# Patient Record
Sex: MALE | Race: ASIAN | NOT HISPANIC OR LATINO | URBAN - METROPOLITAN AREA
[De-identification: names, ages, dates, MRNs, and addresses within clinical notes are randomized per-mention and may not be internally consistent; named-entity substitution may affect disease eponyms.]

---

## 2022-02-10 ENCOUNTER — EMERGENCY (EMERGENCY)
Facility: HOSPITAL | Age: 32
LOS: 1 days | Discharge: HOME | End: 2022-02-10
Admitting: EMERGENCY MEDICINE
Payer: COMMERCIAL

## 2022-02-10 VITALS
DIASTOLIC BLOOD PRESSURE: 76 MMHG | TEMPERATURE: 97 F | RESPIRATION RATE: 16 BRPM | HEART RATE: 98 BPM | OXYGEN SATURATION: 99 % | SYSTOLIC BLOOD PRESSURE: 123 MMHG

## 2022-02-10 PROCEDURE — 99283 EMERGENCY DEPT VISIT LOW MDM: CPT

## 2022-02-10 NOTE — ED PROVIDER NOTE - PHYSICAL EXAMINATION
Vital signs reviewed.   CONSTITUTIONAL: Well-appearing; well-nourished; in no apparent distress. Non-toxic appearing.   HEAD: Normocephalic, atraumatic.  EYES: Normal conjunctiva and no sclera injection noted  ENT: normal nose; no rhinorrhea  NECK: No FAROM. No midline tenderness.  CARD: Normal S1, S2  RESP: Normal chest excursion with respiration; breath sounds clear and equal bilaterally  EXT/MS: LT lower extermity. FAROM of knee. No swelling. Minimal tenderness. Pedal pulse intact  SKIN: Normal for age and race; warm; dry; good turgor; no apparent lesions or exudate noted.  NEURO: Awake, alert, oriented x 3.  PSYCH: Normal mood; appropriate affect.
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
## 2022-02-10 NOTE — ED PROVIDER NOTE - NS ED ROS FT
Constitutional: (-) fever   Head: Normal cephalic, Atraumatic  Cardiovascular: (-) chest pain, (-) wheezing  Respiratory: (-) cough, (-) shortness of breath  Gastrointestinal: (-) vomiting, (-) diarrhea, (-) abdominal pain  : (-) dysuria   Musculoskeletal: (-) back pain (+) LT leg pain  Integumentary: (-) rash, (-) edema  Neurological: (-)loc  Allergic/Immunologic: (-) pruritus
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
## 2022-02-10 NOTE — ED PROVIDER NOTE - PATIENT PORTAL LINK FT
You can access the FollowMyHealth Patient Portal offered by [facility] by registering at the following website: [email or web address]. By joining [employer]’s FollowMyHealth portal, you will also be able to view your health information using other applications (apps) compatible with our system.

## 2022-11-17 ENCOUNTER — EMERGENCY (EMERGENCY)
Facility: HOSPITAL | Age: 32
LOS: 1 days | Discharge: HOME | End: 2022-11-17
Attending: STUDENT IN AN ORGANIZED HEALTH CARE EDUCATION/TRAINING PROGRAM | Admitting: STUDENT IN AN ORGANIZED HEALTH CARE EDUCATION/TRAINING PROGRAM

## 2022-11-17 VITALS
HEART RATE: 78 BPM | DIASTOLIC BLOOD PRESSURE: 69 MMHG | OXYGEN SATURATION: 99 % | RESPIRATION RATE: 16 BRPM | TEMPERATURE: 98 F | SYSTOLIC BLOOD PRESSURE: 117 MMHG

## 2022-11-17 PROCEDURE — 99283 EMERGENCY DEPT VISIT LOW MDM: CPT

## 2022-11-17 RX ORDER — OMEPRAZOLE 10 MG/1
1 CAPSULE, DELAYED RELEASE ORAL
Qty: 30 | Refills: 0
Start: 2022-11-17 | End: 2022-12-16

## 2022-11-17 NOTE — ED PROVIDER NOTE - NSFOLLOWUPINSTRUCTIONS_ED_ALL_ED_FT
Follow-up with your primary care doctor  Follow- up with Gastroenterology  Continue taking omeprazole daily before breakfast  When symptoms flare, take maalox or tums, available over the counter, as directed  Avoid excessive greasy and spicey foods as able.  Return to ED with any new / worsening symptoms or anything else concerning to you

## 2022-11-17 NOTE — ED ADULT TRIAGE NOTE - CHIEF COMPLAINT QUOTE
pt c/o RLQ pain beginning this morning with n/v. no diarrhea, fever, urinary symptoms. pt in no distress. no PMH

## 2022-11-17 NOTE — ED PROVIDER NOTE - CLINICAL SUMMARY MEDICAL DECISION MAKING FREE TEXT BOX
31M h/o gastritis p/w now resolved epigastric pain, which he had woken up with this AM, a/w one episode of NBNB vomiting. Currently symptom free. Abd exam benign and nontender. Likely 2/2 gastritis/GERD. Has omeprazole at home. WIll provide GI follow-up

## 2022-11-17 NOTE — ED PROVIDER NOTE - PATIENT PORTAL LINK FT
You can access the FollowMyHealth Patient Portal offered by [facility] by registering at the following website: [email or web address]. By joining [employer]’s FollowMyHealth portal, you will also be able to view your health information using other applications (apps) compatible with our system.

## 2022-11-17 NOTE — ED PROVIDER NOTE - OBJECTIVE STATEMENT
31M h/o GERD p/w abd pain, now resolved. States he awoke with epigastric pain, a/w NBNB vomiting x1. Took omeprazole and symptoms resolved within 3 hrs. Denies diarrhea, sick contacts or travels. No fever/chills. Noted similar pain ~3months ago, typically associated with spicey foods or if he doesn't eat for a prolonged period of time.

## 2024-01-29 ENCOUNTER — EMERGENCY (EMERGENCY)
Facility: HOSPITAL | Age: 34
LOS: 1 days | Discharge: HOME | End: 2024-01-29
Attending: STUDENT IN AN ORGANIZED HEALTH CARE EDUCATION/TRAINING PROGRAM | Admitting: STUDENT IN AN ORGANIZED HEALTH CARE EDUCATION/TRAINING PROGRAM
Payer: COMMERCIAL

## 2024-01-29 VITALS
HEART RATE: 75 BPM | SYSTOLIC BLOOD PRESSURE: 122 MMHG | RESPIRATION RATE: 18 BRPM | OXYGEN SATURATION: 100 % | DIASTOLIC BLOOD PRESSURE: 78 MMHG | TEMPERATURE: 98 F

## 2024-01-29 VITALS
SYSTOLIC BLOOD PRESSURE: 135 MMHG | TEMPERATURE: 98 F | HEART RATE: 74 BPM | DIASTOLIC BLOOD PRESSURE: 88 MMHG | OXYGEN SATURATION: 98 % | RESPIRATION RATE: 97 BRPM

## 2024-01-29 PROBLEM — K29.70 GASTRITIS, UNSPECIFIED, WITHOUT BLEEDING: Chronic | Status: ACTIVE | Noted: 2022-11-17

## 2024-01-29 PROBLEM — M25.511 RIGHT SHOULDER PAIN: Status: ACTIVE | Noted: 2024-01-29

## 2024-01-29 PROBLEM — Z00.00 ENCOUNTER FOR PREVENTIVE HEALTH EXAMINATION: Status: ACTIVE | Noted: 2024-01-29

## 2024-01-29 PROCEDURE — 73030 X-RAY EXAM OF SHOULDER: CPT | Mod: 26,RT

## 2024-01-29 PROCEDURE — 99284 EMERGENCY DEPT VISIT MOD MDM: CPT

## 2024-01-29 RX ORDER — ACETAMINOPHEN 500 MG
975 TABLET ORAL ONCE
Refills: 0 | Status: COMPLETED | OUTPATIENT
Start: 2024-01-29 | End: 2024-01-29

## 2024-01-29 RX ORDER — LIDOCAINE 4 G/100G
1 CREAM TOPICAL ONCE
Refills: 0 | Status: COMPLETED | OUTPATIENT
Start: 2024-01-29 | End: 2024-01-29

## 2024-01-29 RX ORDER — KETOROLAC TROMETHAMINE 30 MG/ML
30 SYRINGE (ML) INJECTION ONCE
Refills: 0 | Status: DISCONTINUED | OUTPATIENT
Start: 2024-01-29 | End: 2024-01-29

## 2024-01-29 RX ADMIN — Medication 975 MILLIGRAM(S): at 11:00

## 2024-01-29 RX ADMIN — Medication 30 MILLIGRAM(S): at 11:00

## 2024-01-29 RX ADMIN — LIDOCAINE 1 PATCH: 4 CREAM TOPICAL at 11:00

## 2024-01-29 NOTE — ED ADULT TRIAGE NOTE - CHIEF COMPLAINT QUOTE
pt c/o shoulder/neck pain x 1 month with painful ROM. denies trauma. went to MD and given muscle relaxer.

## 2024-01-29 NOTE — ED PROVIDER NOTE - PATIENT PORTAL LINK FT
You can access the FollowMyHealth Patient Portal offered by [facility] by registering at the following website: [email or web address]. By joining [employer]’s FollowMyHealth portal, you will also be able to view your health information using other applications (apps) compatible with our system.

## 2024-01-29 NOTE — ED ADULT NURSE NOTE - OBJECTIVE STATEMENT
Pt received to wellness , awake and alert, A&OX4, ambulatory. C/o R shoulder pain x 1 month with radiation to R neck and upper back. Denies injury or trauma.     Respirations even and unlabored. Denies CP, SOB, N/V, HA, dizziness, palpitations, blurry vision, numbness or tingling, bowel or bladder incontinence.. Bed in lowest position, call bell within reach. Safety maintained.
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
## 2024-01-29 NOTE — ED PROVIDER NOTE - PHYSICAL EXAMINATION
Gen:  Well appearning in NAD  Head:  NC/AT  Resp: No distress   Ext: no deformities, right shoulder with no bony tenderness, FROM wihtout pain, pulses intact   Skin: warm and dry as visualized

## 2024-01-29 NOTE — ED ADULT NURSE NOTE - NSFALLUNIVINTERV_ED_ALL_ED
Bed/Stretcher in lowest position, wheels locked, appropriate side rails in place/Call bell, personal items and telephone in reach/Instruct patient to call for assistance before getting out of bed/chair/stretcher/Non-slip footwear applied when patient is off stretcher/[place] to call system/Physically safe environment - no spills, clutter or unnecessary equipment/Purposeful proactive rounding/Room/bathroom lighting operational, light cord in reach
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
## 2024-01-29 NOTE — ED PROVIDER NOTE - NSFOLLOWUPINSTRUCTIONS_ED_ALL_ED_FT
1. TAKE ALL MEDICATIONS AS DIRECTED.    2. FOR PAIN OR FEVER YOU CAN TAKE IBUPROFEN (MOTRIN, ADVIL) OR ACETAMINOPHEN (TYLENOL) AS NEEDED, AS DIRECTED ON PACKAGING.  3. FOLLOW UP WITH YOUR PRIMARY DOCTOR WITHIN 5 DAYS AS DIRECTED.  4. IF YOU HAD LABS OR IMAGING DONE, YOU WERE GIVEN COPIES OF ALL LABS AND/OR IMAGING RESULTS FROM YOUR ER VISIT--PLEASE TAKE THEM WITH YOU TO YOUR FOLLOW UP APPOINTMENTS.  5. IF NEEDED, CALL PATIENT ACCESS SERVICES AT [phone] TO FIND A PRIMARY CARE PHYSICIAN.  OR CALL [phone] TO MAKE AN APPOINTMENT WITH THE CLINIC.  6. RETURN TO THE ER FOR ANY WORSENING SYMPTOMS OR CONCERNS.    FOllow up with orthopedics        English    Shoulder Pain  Many things can cause shoulder pain, including:  An injury to the shoulder.  Overuse of the shoulder.  Arthritis.  The source of the pain can be:  Inflammation.  An injury to the shoulder joint.  An injury to a tendon, ligament, or bone.  Follow these instructions at home:  Pay attention to changes in your symptoms. Let your health care provider know about them. Follow these instructions to relieve your pain.    If you have a removable sling:    Wear the sling as told by your provider. Remove it only as told by your provider.  Check the skin around the sling every day. Tell your provider about any concerns.  Loosen the sling if your fingers tingle, become numb, or become cold.  Keep the sling clean.  If the sling is not waterproof:  Do not let it get wet.  Remove it to shower or bathe.  Move your arm as little as possible, but keep your hand moving to prevent swelling.  Managing pain, stiffness, and swelling    Bag of ice on a towel on the skin.  If told, put ice on the painful area.  If you have a removable sling or immobilizer, remove it as told by your provider.  Put ice in a plastic bag.  Place a towel between your skin and the bag.  Leave the ice on for 20 minutes, 2–3 times a day.  If your skin turns bright red, remove the ice right away to prevent skin damage. The risk of damage is higher if you cannot feel pain, heat, or cold.  Move your fingers often to reduce stiffness and swelling.  Squeeze a soft ball or a foam pad as much as possible. This helps to keep the shoulder from swelling. It also helps to strengthen the arm.  General instructions    Take over-the-counter and prescription medicines only as told by your provider.  Exercise may help with pain management. Perform exercises if told by your provider.  You may be referred to a physical therapist to help in your recovery process.  Keep all follow-up visits in order to avoid any type of permanent shoulder disability or chronic pain problems.  Contact a health care provider if:  Your pain is not relieved with medicines.  New pain develops in your arm, hand, or fingers.  You loosen your sling and your arm, hand, or fingers remain tingly, numb, swollen, or painful.  Get help right away if:  Your arm, hand, or fingers turn white or blue.  This information is not intended to replace advice given to you by your health care provider. Make sure you discuss any questions you have with your health care provider.    Document Revised: 07/21/2023 Document Reviewed: 07/21/2023  [employer] Patient Education © 2023 [employer]  [employer] logo  Terms and Conditions  Privacy Policy  Editorial Policy  All content on this site: Copyright © 2024 Elsevier, its licensors, and contributors. All rights are reserved, including those for text and data mining, AI training, and similar technologies. For all open access content, the Creative Commons licensing terms apply.  Cookies are used by this site. To decline or learn more, visit our Cookies page.  [employer]

## 2024-02-01 ENCOUNTER — APPOINTMENT (OUTPATIENT)
Dept: ORTHOPEDIC SURGERY | Facility: CLINIC | Age: 34
End: 2024-02-01
Payer: COMMERCIAL

## 2024-02-01 VITALS
SYSTOLIC BLOOD PRESSURE: 132 MMHG | HEIGHT: 68 IN | WEIGHT: 180 LBS | DIASTOLIC BLOOD PRESSURE: 86 MMHG | OXYGEN SATURATION: 96 % | HEART RATE: 78 BPM | BODY MASS INDEX: 27.28 KG/M2

## 2024-02-01 DIAGNOSIS — M25.511 PAIN IN RIGHT SHOULDER: ICD-10-CM

## 2024-02-01 PROCEDURE — 72040 X-RAY EXAM NECK SPINE 2-3 VW: CPT

## 2024-02-01 PROCEDURE — 99203 OFFICE O/P NEW LOW 30 MIN: CPT

## 2024-02-01 PROCEDURE — 73030 X-RAY EXAM OF SHOULDER: CPT | Mod: RT

## 2024-02-03 NOTE — HISTORY OF PRESENT ILLNESS
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  Patient has been experiencing right shoulder neck pain for about 1 month.  Then on Sunday, pain worsened and he had difficulty moving the arm.  Pain can radiate down the arm.  It is located over the neck and trapezius.  Patient has tried meloxicam and gabapentin, with some relief.  No numbness or tingling.  No clumsiness.  No gait issues.  No bowel or bladder changes.  History: None

## 2024-02-03 NOTE — DISCUSSION/SUMMARY
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  Patient has been experiencing right shoulder and neck pain for about 1 month.  X-rays showed no obvious fractures or dislocations.  Examination showed good right shoulder range of motion.  Discussed with patient the examination and imaging findings.  Discussed with patient the potential etiologies of his pain including, not limited to, muscle strain and cervical spine pathology.  Patient was given a referral to physical therapy.  Discussed that would be beneficial undergo further evaluation by spine specialist.  Patient was given a referral to Dr. Cope.  Patient understanding and in agreement the plan.  All questions answered.  Plan: -Physical therapy -Follow-up with Dr. Cope

## 2024-02-03 NOTE — PHYSICAL EXAM
Telephone Encounter by Elicia Oreilly NCMA at 05/01/18 01:14 PM     Author:  Elicia Oreilly NCMA Service:  (none) Author Type:  Certified Medical Assistant     Filed:  05/01/18 01:16 PM Encounter Date:  5/1/2018 Status:  Signed     :  Elicia Oreilly NCMA (Certified Medical Assistant)            Refilled per protocol. Last office visit 03/21/18 and is to follow up in 6 months.[AH1.1M]       Revision History        User Key Date/Time User Provider Type Action    > AH1.1 05/01/18 01:16 PM Elicia Oreilly NCMA Certified Medical Assistant Sign    M - Manual             [de-identified] : Constitutional: 33 year old male, alert and oriented, cooperative, in no acute distress.  HEENT  NC/AT.  Appearance: symmetric  Neck/Back Straight without deformity or instability.  Good ROM.  Chest/Respiratory  Respiratory effort: no intercostal retractions or use of accessory muscles. Nonlabored Breathing  Mental Status:  Judgment, insight: intact Orientation: oriented to time, place, and person  Neurological: Sensory and Motor are grossly intact throughout  Right Shoulder  Inspection:     Skin intact, no rashes or lesions     Non-tender to palpation over AC Joint, Deltoid/Rotator Cuff Insertions, Biceps Tendon  Range of Motion: 	Abduction - 110 	Forward flexion - 160                 IR: T12                 ER: 45  Shoulder Testing      No pain with Empty Can/Job Test      No pain with impingement tests      No pain with IR/ER shoulder  Neurologic Exam     Motor intact including 5/5 AIN/PIN/Median/Radial/Ulnar Nerve Distributions     Sensation Intact to Light Touch including Median/Radial/Ulnar nerve distributions  Vascular Exam     Hand is warm and well perfused with 2+ Radial Pulse   Spine Exam: No midline Tenderness to Palpation over the Cervical spine There is right paraspinal muscle Tenderness to Palpation Negative Butts  Motor:                 C5               C6               C7               C8               T1  R            5/5               5/5              5/5               5/5              5/5 L            5/5               5/5              5/5               5/5              5/5  Sensory:                C5          C6          C7          C8          T1        (0=absent, 1=impaired, 2=normal, NT=not testable) R              2            2             2            2            2 L              2            2             2            2            2 [de-identified] : XRay:  XRays of the Cervical Spine (2 Views) taken in the office today and discussed with the patient. XRays demonstrate no obvious fracture or dislocation. (my personal interpretation).  XRay: XRays of the Right Shoulder (1 View, Axillary) taken in the office today and reviewed with the patient. XRays demonstrate no obvious fractures or dislocations. There is no significant evidence of osteoarthritis. (my personal interpretation)  ACC: [id number] EXAM: XR SHOULDER COMP MIN 2V RT ORDERED BY: RENEE SALAZAR  PROCEDURE DATE: 01/29/2024    INTERPRETATION: CLINICAL INDICATION: Right shoulder pain  TECHNIQUE: 3 views of the right shoulder  COMPARISON: None available.  FINDINGS: No acute fracture or dislocation. Normal bony mineralization. The acromioclavicular and glenohumeral joint spaces are maintained. Clear visualized lungs.   IMPRESSION: No acute fracture or dislocation.  --- End of Report ---     LEROY WINN MD; Resident Radiologist This document has been electronically signed. JOJO HARDY MD; Attending Radiologist This document has been electronically signed. Jan 29 2024 10:57AM

## 2024-02-03 NOTE — REVIEW OF SYSTEMS
[Joint Pain] : joint pain [Negative] : Endocrine [Joint Swelling] : no joint swelling [Joint Stiffness] : no joint stiffness [Fever] : no fever [Chills] : no chills

## 2024-02-12 ENCOUNTER — TRANSCRIPTION ENCOUNTER (OUTPATIENT)
Age: 34
End: 2024-02-12

## 2024-02-12 ENCOUNTER — APPOINTMENT (OUTPATIENT)
Dept: ORTHOPEDIC SURGERY | Facility: CLINIC | Age: 34
End: 2024-02-12
Payer: COMMERCIAL

## 2024-02-12 VITALS
HEIGHT: 68 IN | BODY MASS INDEX: 27.58 KG/M2 | SYSTOLIC BLOOD PRESSURE: 110 MMHG | HEART RATE: 84 BPM | DIASTOLIC BLOOD PRESSURE: 75 MMHG | WEIGHT: 182 LBS

## 2024-02-12 DIAGNOSIS — M54.12 RADICULOPATHY, CERVICAL REGION: ICD-10-CM

## 2024-02-12 PROCEDURE — 99213 OFFICE O/P EST LOW 20 MIN: CPT

## 2024-02-12 PROCEDURE — 99203 OFFICE O/P NEW LOW 30 MIN: CPT

## 2024-02-12 NOTE — PHYSICAL EXAM
[de-identified] : Cervical Physical Exam:  Gait - Normal  Station- Normal  Sagittal Balance- Normal  Compensatory mechanism - none  Horizontal Gaze- Maintained  Heel walk- Normal  Toe walk- Normal  Reflexes: Biceps- Normal Triceps- Normal Brachioradialis- Normal Patellar- Normal Gastroc- Normal Clonus- No  Butts's- None  Shoulder Exam - Normal  Spurling's- None  Wrist Pulses- 2+ radial/ulnar  Cervical range of motion- minimally reduced, mild discomfort when turns head to the left  Sensation C5-T1 sensation intact to light touch bilaterally  L1-S1 sensation intact to light touch bilaterally  Motor                  Right/Left - Deltoid     5/5 - Biceps     5/5 - Triceps    5/5 - WF          5/5 - WE          5/5 - IO            5/5 -         5/5  - IP             5/5 - Quad       5/5 - HS           5/5 - TA           5/5 - Gastroc   5/5 - EHL          5/5 [de-identified] : Xray cervical 2 views (outside provider) No fracture No disc degeneration  No facet arthropathy

## 2024-02-12 NOTE — HISTORY OF PRESENT ILLNESS
[de-identified] : 32 yo male with 2 weeks of neck pain and right shoulder pain. Denies any injury or fall. Saw Dr. Orourke and was given medication and has been seeing a Chiropractor. He states over the past week he has not needed any pain medication. Denies any issues with balance or hand dexterity. Overall he is pleased with his recovery to date.

## 2024-05-10 ENCOUNTER — APPOINTMENT (OUTPATIENT)
Dept: PEDIATRIC MEDICAL GENETICS | Facility: CLINIC | Age: 34
End: 2024-05-10
Payer: COMMERCIAL

## 2024-05-10 PROCEDURE — 96040: CPT | Mod: 95

## 2025-07-09 ENCOUNTER — APPOINTMENT (OUTPATIENT)
Dept: ORTHOPEDIC SURGERY | Facility: CLINIC | Age: 35
End: 2025-07-09
Payer: OTHER MISCELLANEOUS

## 2025-07-09 VITALS — HEIGHT: 68 IN | BODY MASS INDEX: 27.58 KG/M2 | WEIGHT: 182 LBS

## 2025-07-09 PROBLEM — S13.9XXA NECK SPRAIN, INITIAL ENCOUNTER: Status: ACTIVE | Noted: 2025-07-09

## 2025-07-09 PROBLEM — S43.491A SPRAIN OF OTHER PART OF RIGHT SHOULDER REGION, INITIAL ENCOUNTER: Status: ACTIVE | Noted: 2025-07-09

## 2025-07-09 PROCEDURE — 99204 OFFICE O/P NEW MOD 45 MIN: CPT

## 2025-07-09 PROCEDURE — 72040 X-RAY EXAM NECK SPINE 2-3 VW: CPT

## 2025-07-09 PROCEDURE — 73030 X-RAY EXAM OF SHOULDER: CPT | Mod: RT

## 2025-07-09 RX ORDER — MELOXICAM 15 MG/1
15 TABLET ORAL
Qty: 30 | Refills: 1 | Status: ACTIVE | COMMUNITY
Start: 2025-07-09 | End: 1900-01-01

## 2025-07-30 ENCOUNTER — APPOINTMENT (OUTPATIENT)
Dept: MRI IMAGING | Facility: CLINIC | Age: 35
End: 2025-07-30

## 2025-08-06 RX ORDER — DIAZEPAM 5 MG/1
5 TABLET ORAL
Qty: 2 | Refills: 0 | Status: ACTIVE | COMMUNITY
Start: 2025-08-06 | End: 1900-01-01

## 2025-08-07 ENCOUNTER — APPOINTMENT (OUTPATIENT)
Dept: MRI IMAGING | Facility: CLINIC | Age: 35
End: 2025-08-07
Payer: OTHER MISCELLANEOUS

## 2025-08-07 PROCEDURE — 73221 MRI JOINT UPR EXTREM W/O DYE: CPT

## 2025-08-14 ENCOUNTER — APPOINTMENT (OUTPATIENT)
Dept: ORTHOPEDIC SURGERY | Facility: CLINIC | Age: 35
End: 2025-08-14

## 2025-08-19 ENCOUNTER — NON-APPOINTMENT (OUTPATIENT)
Age: 35
End: 2025-08-19